# Patient Record
Sex: MALE | URBAN - METROPOLITAN AREA
[De-identification: names, ages, dates, MRNs, and addresses within clinical notes are randomized per-mention and may not be internally consistent; named-entity substitution may affect disease eponyms.]

---

## 2020-12-10 ENCOUNTER — APPOINTMENT (RX ONLY)
Dept: URBAN - METROPOLITAN AREA CLINIC 139 | Facility: CLINIC | Age: 75
Setting detail: DERMATOLOGY
End: 2020-12-10

## 2020-12-10 DIAGNOSIS — L81.4 OTHER MELANIN HYPERPIGMENTATION: ICD-10-CM

## 2020-12-10 DIAGNOSIS — L91.8 OTHER HYPERTROPHIC DISORDERS OF THE SKIN: ICD-10-CM

## 2020-12-10 DIAGNOSIS — L73.8 OTHER SPECIFIED FOLLICULAR DISORDERS: ICD-10-CM

## 2020-12-10 DIAGNOSIS — L72.3 SEBACEOUS CYST: ICD-10-CM

## 2020-12-10 DIAGNOSIS — L82.1 OTHER SEBORRHEIC KERATOSIS: ICD-10-CM

## 2020-12-10 DIAGNOSIS — Z12.83 ENCOUNTER FOR SCREENING FOR MALIGNANT NEOPLASM OF SKIN: ICD-10-CM

## 2020-12-10 PROCEDURE — 99202 OFFICE O/P NEW SF 15 MIN: CPT

## 2020-12-10 PROCEDURE — ? COUNSELING

## 2020-12-10 ASSESSMENT — LOCATION DETAILED DESCRIPTION DERM
LOCATION DETAILED: RIGHT LATERAL FOREHEAD
LOCATION DETAILED: RIGHT AXILLARY VAULT
LOCATION DETAILED: RIGHT FOREHEAD
LOCATION DETAILED: LEFT INFERIOR UPPER BACK
LOCATION DETAILED: RIGHT LATERAL TRAPEZIAL NECK
LOCATION DETAILED: LEFT AXILLARY VAULT
LOCATION DETAILED: RIGHT SUPERIOR LATERAL MALAR CHEEK
LOCATION DETAILED: RIGHT SUPERIOR UPPER BACK

## 2020-12-10 ASSESSMENT — PAIN INTENSITY VAS: HOW INTENSE IS YOUR PAIN 0 BEING NO PAIN, 10 BEING THE MOST SEVERE PAIN POSSIBLE?: NO PAIN

## 2020-12-10 ASSESSMENT — LOCATION SIMPLE DESCRIPTION DERM
LOCATION SIMPLE: RIGHT FOREHEAD
LOCATION SIMPLE: RIGHT UPPER BACK
LOCATION SIMPLE: POSTERIOR NECK
LOCATION SIMPLE: LEFT UPPER BACK
LOCATION SIMPLE: LEFT AXILLARY VAULT
LOCATION SIMPLE: RIGHT CHEEK
LOCATION SIMPLE: RIGHT AXILLARY VAULT

## 2020-12-10 ASSESSMENT — LOCATION ZONE DERM
LOCATION ZONE: TRUNK
LOCATION ZONE: NECK
LOCATION ZONE: FACE
LOCATION ZONE: AXILLAE

## 2022-04-01 ENCOUNTER — APPOINTMENT (RX ONLY)
Dept: URBAN - METROPOLITAN AREA CLINIC 139 | Facility: CLINIC | Age: 77
Setting detail: DERMATOLOGY
End: 2022-04-01

## 2022-04-01 DIAGNOSIS — L57.0 ACTINIC KERATOSIS: ICD-10-CM

## 2022-04-01 DIAGNOSIS — Z12.83 ENCOUNTER FOR SCREENING FOR MALIGNANT NEOPLASM OF SKIN: ICD-10-CM

## 2022-04-01 DIAGNOSIS — D17 BENIGN LIPOMATOUS NEOPLASM: ICD-10-CM

## 2022-04-01 DIAGNOSIS — L91.8 OTHER HYPERTROPHIC DISORDERS OF THE SKIN: ICD-10-CM

## 2022-04-01 DIAGNOSIS — L73.8 OTHER SPECIFIED FOLLICULAR DISORDERS: ICD-10-CM

## 2022-04-01 DIAGNOSIS — L82.0 INFLAMED SEBORRHEIC KERATOSIS: ICD-10-CM

## 2022-04-01 PROBLEM — D17.0 BENIGN LIPOMATOUS NEOPLASM OF SKIN AND SUBCUTANEOUS TISSUE OF HEAD, FACE AND NECK: Status: ACTIVE | Noted: 2022-04-01

## 2022-04-01 PROCEDURE — ? SKIN TAG REMOVAL

## 2022-04-01 PROCEDURE — ? OTHER

## 2022-04-01 PROCEDURE — ? LIQUID NITROGEN

## 2022-04-01 PROCEDURE — 99213 OFFICE O/P EST LOW 20 MIN: CPT | Mod: 25

## 2022-04-01 PROCEDURE — 17000 DESTRUCT PREMALG LESION: CPT | Mod: 59

## 2022-04-01 PROCEDURE — 11200 RMVL SKIN TAGS UP TO&INC 15: CPT | Mod: 59

## 2022-04-01 PROCEDURE — 17110 DESTRUCTION B9 LES UP TO 14: CPT

## 2022-04-01 PROCEDURE — ? COUNSELING

## 2022-04-01 ASSESSMENT — LOCATION DETAILED DESCRIPTION DERM
LOCATION DETAILED: MEDIAL FRONTAL SCALP
LOCATION DETAILED: LEFT CENTRAL FRONTAL SCALP
LOCATION DETAILED: RIGHT FOREHEAD
LOCATION DETAILED: RIGHT INFERIOR LATERAL MALAR CHEEK
LOCATION DETAILED: RIGHT CENTRAL TEMPLE
LOCATION DETAILED: LEFT INFERIOR MEDIAL FOREHEAD
LOCATION DETAILED: LEFT MEDIAL TRAPEZIAL NECK

## 2022-04-01 ASSESSMENT — LOCATION SIMPLE DESCRIPTION DERM
LOCATION SIMPLE: LEFT FOREHEAD
LOCATION SIMPLE: RIGHT FOREHEAD
LOCATION SIMPLE: FRONTAL SCALP
LOCATION SIMPLE: RIGHT CHEEK
LOCATION SIMPLE: POSTERIOR NECK
LOCATION SIMPLE: LEFT SCALP
LOCATION SIMPLE: RIGHT TEMPLE

## 2022-04-01 ASSESSMENT — LOCATION ZONE DERM
LOCATION ZONE: FACE
LOCATION ZONE: NECK
LOCATION ZONE: SCALP

## 2022-04-01 NOTE — PROCEDURE: SKIN TAG REMOVAL
Detail Level: Detailed
Medical Necessity Information: It is in your best interest to select a reason for this procedure from the list below. All of these items fulfill various CMS LCD requirements except the new and changing color options.
Consent: Written consent obtained and the risks of skin tag removal was reviewed with the patient including but not limited to bleeding, pigmentary change, infection, pain, and remote possibility of scarring.
Add Associated Diagnoses If Applicable When Selecting Medical Necessity: Yes
Medical Necessity Clause: This procedure was medically necessary because the lesions that were treated were:
Include Z78.9 (Other Specified Conditions Influencing Health Status) As An Associated Diagnosis?: No

## 2022-04-01 NOTE — PROCEDURE: LIQUID NITROGEN
Render Post-Care Instructions In Note?: no
Show Applicator Variable?: Yes
Number Of Freeze-Thaw Cycles: 1 freeze-thaw cycle
Consent: The patient's consent was obtained including but not limited to risks of crusting, scabbing, blistering, scarring, darker or lighter pigmentary change, recurrence, incomplete removal and infection.
Detail Level: Detailed
Post-Care Instructions: I reviewed with the patient in detail post-care instructions. Patient is to wear sunprotection, and avoid picking at any of the treated lesions. Pt may apply Vaseline to crusted or scabbing areas.
Duration Of Freeze Thaw-Cycle (Seconds): 0
Medical Necessity Clause: This procedure was medically necessary because the lesions that were treated were:
Spray Paint Text: The liquid nitrogen was applied to the skin utilizing a spray paint frosting technique.
Medical Necessity Information: It is in your best interest to select a reason for this procedure from the list below. All of these items fulfill various CMS LCD requirements except the new and changing color options.
Total Number Of Lesions Treated: 2
Detail Level: Zone

## 2022-04-01 NOTE — PROCEDURE: OTHER
Detail Level: Zone
Render Risk Assessment In Note?: no
Note Text (......Xxx Chief Complaint.): This diagnosis correlates with the
Other (Free Text): Will continue to monitor lesion on mid frontal scalp

## 2023-10-25 ENCOUNTER — APPOINTMENT (RX ONLY)
Dept: URBAN - METROPOLITAN AREA CLINIC 139 | Facility: CLINIC | Age: 78
Setting detail: DERMATOLOGY
End: 2023-10-25

## 2023-10-25 DIAGNOSIS — L82.0 INFLAMED SEBORRHEIC KERATOSIS: ICD-10-CM

## 2023-10-25 DIAGNOSIS — L81.4 OTHER MELANIN HYPERPIGMENTATION: ICD-10-CM

## 2023-10-25 DIAGNOSIS — L82.1 OTHER SEBORRHEIC KERATOSIS: ICD-10-CM

## 2023-10-25 DIAGNOSIS — L73.8 OTHER SPECIFIED FOLLICULAR DISORDERS: ICD-10-CM

## 2023-10-25 PROCEDURE — 17110 DESTRUCTION B9 LES UP TO 14: CPT

## 2023-10-25 PROCEDURE — ? LIQUID NITROGEN

## 2023-10-25 PROCEDURE — ? COUNSELING

## 2023-10-25 PROCEDURE — 99212 OFFICE O/P EST SF 10 MIN: CPT | Mod: 25

## 2023-10-25 ASSESSMENT — LOCATION DETAILED DESCRIPTION DERM
LOCATION DETAILED: RIGHT SUPERIOR FRONTAL SCALP
LOCATION DETAILED: RIGHT INFERIOR CENTRAL MALAR CHEEK
LOCATION DETAILED: LEFT INFERIOR CENTRAL MALAR CHEEK

## 2023-10-25 ASSESSMENT — LOCATION SIMPLE DESCRIPTION DERM
LOCATION SIMPLE: RIGHT CHEEK
LOCATION SIMPLE: LEFT CHEEK
LOCATION SIMPLE: SCALP

## 2023-10-25 ASSESSMENT — LOCATION ZONE DERM
LOCATION ZONE: FACE
LOCATION ZONE: SCALP

## 2023-11-15 ENCOUNTER — APPOINTMENT (RX ONLY)
Dept: URBAN - METROPOLITAN AREA CLINIC 139 | Facility: CLINIC | Age: 78
Setting detail: DERMATOLOGY
End: 2023-11-15

## 2023-11-15 DIAGNOSIS — L72.0 EPIDERMAL CYST: ICD-10-CM

## 2023-11-15 PROCEDURE — ? COUNSELING

## 2023-11-15 PROCEDURE — ? PRESCRIPTION

## 2023-11-15 PROCEDURE — ? OBSERVATION AND MEASURE

## 2023-11-15 PROCEDURE — 11900 INJECT SKIN LESIONS </W 7: CPT

## 2023-11-15 PROCEDURE — ? INTRALESIONAL KENALOG

## 2023-11-15 RX ORDER — DOXYCYCLINE HYCLATE 100 MG/1
1 CAPSULE, GELATIN COATED ORAL BID
Qty: 20 | Refills: 0 | Status: ERX

## 2023-11-15 ASSESSMENT — LOCATION DETAILED DESCRIPTION DERM: LOCATION DETAILED: LEFT MEDIAL UPPER BACK

## 2023-11-15 ASSESSMENT — LOCATION ZONE DERM: LOCATION ZONE: TRUNK

## 2023-11-15 ASSESSMENT — LOCATION SIMPLE DESCRIPTION DERM: LOCATION SIMPLE: LEFT UPPER BACK

## 2023-11-15 NOTE — PROCEDURE: INTRALESIONAL KENALOG
Detail Level: Detailed
Medical Necessity Clause: This procedure was medically necessary because the lesions that were treated were:
How Many Mls Were Removed From The 40 Mg/Ml (1ml) Vial When Preparing The Injectable Solution?: 0
Require Ndc Code?: No
Kenalog Type Of Vial: Multiple Dose
Kenalog Preparation: Kenalog
Administered By (Optional): Aliza Baptiste
Lot # (Optional): [id number]
Expiration Date (Optional): NOV 2025
Consent: The risks of atrophy were reviewed with the patient.
Total Volume Injected (Ccs- Only Use Numbers And Decimals): 1.0
Validate Note Data When Using Inventory: Yes
Which Kenalog Vial Was Used?: Kenalog 10 mg/ml (5 ml vial)
Concentration Of Solution Injected (Mg/Ml): 20.0
Left arm;

## 2023-12-06 ENCOUNTER — APPOINTMENT (RX ONLY)
Dept: URBAN - METROPOLITAN AREA CLINIC 139 | Facility: CLINIC | Age: 78
Setting detail: DERMATOLOGY
End: 2023-12-06

## 2023-12-06 DIAGNOSIS — L72.0 EPIDERMAL CYST: ICD-10-CM | Status: INADEQUATELY CONTROLLED

## 2023-12-06 PROCEDURE — ? COUNSELING

## 2023-12-06 PROCEDURE — 11900 INJECT SKIN LESIONS </W 7: CPT

## 2023-12-06 PROCEDURE — ? TREATMENT REGIMEN

## 2023-12-06 PROCEDURE — 99212 OFFICE O/P EST SF 10 MIN: CPT | Mod: 25

## 2023-12-06 PROCEDURE — ? PRESCRIPTION

## 2023-12-06 PROCEDURE — ? INTRALESIONAL KENALOG

## 2023-12-06 PROCEDURE — ? OBSERVATION AND MEASURE

## 2023-12-06 PROCEDURE — ? SEPARATE AND IDENTIFIABLE DOCUMENTATION

## 2023-12-06 RX ORDER — AMOXICILLIN AND CLAVULANATE POTASSIUM 875; 125 MG/1; MG/1
1 TABLET, FILM COATED ORAL BID
Qty: 14 | Refills: 0 | Status: ERX

## 2023-12-06 ASSESSMENT — LOCATION ZONE DERM: LOCATION ZONE: TRUNK

## 2023-12-06 ASSESSMENT — LOCATION DETAILED DESCRIPTION DERM: LOCATION DETAILED: LEFT MEDIAL UPPER BACK

## 2023-12-06 ASSESSMENT — LOCATION SIMPLE DESCRIPTION DERM: LOCATION SIMPLE: LEFT UPPER BACK

## 2023-12-06 NOTE — PROCEDURE: TREATMENT REGIMEN
Detail Level: Zone
Discontinue Regimen: doxycycline hyclate 100 mg capsule BID
Initiate Treatment: Augmentin 500 mg-125 mg tablet BID

## 2023-12-06 NOTE — PROCEDURE: INTRALESIONAL KENALOG
Detail Level: Detailed
Medical Necessity Clause: This procedure was medically necessary because the lesions that were treated were:
How Many Mls Were Removed From The 40 Mg/Ml (1ml) Vial When Preparing The Injectable Solution?: 0
Require Ndc Code?: No
Kenalog Type Of Vial: Multiple Dose
Kenalog Preparation: Kenalog
Administered By (Optional): Aliza Baptiste
Lot # (Optional): [id number]
Expiration Date (Optional): NOV 2025
Consent: The risks of atrophy were reviewed with the patient.
Total Volume Injected (Ccs- Only Use Numbers And Decimals): 1.5
Validate Note Data When Using Inventory: Yes
Which Kenalog Vial Was Used?: Kenalog 10 mg/ml (5 ml vial)
Concentration Of Solution Injected (Mg/Ml): 10.0

## 2023-12-13 ENCOUNTER — APPOINTMENT (RX ONLY)
Dept: URBAN - METROPOLITAN AREA CLINIC 139 | Facility: CLINIC | Age: 78
Setting detail: DERMATOLOGY
End: 2023-12-13

## 2023-12-13 DIAGNOSIS — L72.0 EPIDERMAL CYST: ICD-10-CM

## 2023-12-13 PROCEDURE — ? ADDITIONAL NOTES

## 2023-12-13 PROCEDURE — 11900 INJECT SKIN LESIONS </W 7: CPT

## 2023-12-13 PROCEDURE — ? INTRALESIONAL KENALOG

## 2023-12-13 PROCEDURE — ? COUNSELING

## 2023-12-13 ASSESSMENT — LOCATION ZONE DERM: LOCATION ZONE: TRUNK

## 2023-12-13 ASSESSMENT — LOCATION SIMPLE DESCRIPTION DERM: LOCATION SIMPLE: LEFT UPPER BACK

## 2023-12-13 ASSESSMENT — LOCATION DETAILED DESCRIPTION DERM: LOCATION DETAILED: LEFT MEDIAL UPPER BACK

## 2023-12-13 NOTE — PROCEDURE: INTRALESIONAL KENALOG
Which Kenalog Vial Was Used?: Kenalog 40 mg/ml (10 ml vial)
How Many Mls Were Removed From The 80 Mg/Ml (5ml) Vial When Preparing The Injectable Solution?: 0
Bill For Wasted Drug?: no
Kenalog Type Of Vial: Multiple Dose
Validate Note Data When Using Inventory: Yes
Size Of Lesion (Optional): 7
Kenalog Preparation: Kenalog
Lot # (Optional): [id number]
Medical Necessity Clause: This procedure was medically necessary because the lesions that were treated were:
Administered By (Optional): Aliza Baptiste
Detail Level: Detailed
X Size Of Lesion In Cm (Optional): 6
Expiration Date (Optional): JAN 2025
Concentration Of Solution Injected (Mg/Ml): 40.0
Total Volume Injected (Ccs- Only Use Numbers And Decimals): 1
Consent: The risks of atrophy were reviewed with the patient.

## 2023-12-13 NOTE — PROCEDURE: ADDITIONAL NOTES
Detail Level: Simple
Render Risk Assessment In Note?: no
Additional Notes: Dr. Flowers came to evaluate cyst and agrees we should refer patient to [facility] for excision due to size.

## 2024-01-02 ENCOUNTER — APPOINTMENT (RX ONLY)
Dept: URBAN - METROPOLITAN AREA CLINIC 139 | Facility: CLINIC | Age: 79
Setting detail: DERMATOLOGY
End: 2024-01-02

## 2024-01-02 DIAGNOSIS — L72.0 EPIDERMAL CYST: ICD-10-CM

## 2024-01-02 PROCEDURE — ? PRESCRIPTION

## 2024-01-02 PROCEDURE — 10061 I&D ABSCESS COMP/MULTIPLE: CPT

## 2024-01-02 PROCEDURE — ? COUNSELING

## 2024-01-02 PROCEDURE — ? INCISION AND DRAINAGE

## 2024-01-02 RX ORDER — CEPHALEXIN 500 MG/1
TABLET ORAL BID
Qty: 20 | Refills: 1 | Status: ERX | COMMUNITY
Start: 2024-01-02

## 2024-01-02 RX ADMIN — CEPHALEXIN: 500 TABLET ORAL at 00:00

## 2024-01-02 ASSESSMENT — LOCATION SIMPLE DESCRIPTION DERM: LOCATION SIMPLE: LEFT UPPER BACK

## 2024-01-02 ASSESSMENT — LOCATION DETAILED DESCRIPTION DERM
LOCATION DETAILED: LEFT MEDIAL UPPER BACK
LOCATION DETAILED: LEFT INFERIOR MEDIAL UPPER BACK

## 2024-01-02 ASSESSMENT — LOCATION ZONE DERM: LOCATION ZONE: TRUNK

## 2024-01-02 NOTE — PROCEDURE: INCISION AND DRAINAGE
Detail Level: Detailed
Lesion Type: Abscess
I&D Type: complicated
Method: 11 blade
Curette: No
Anesthesia Type: 1% lidocaine with epinephrine
Anesthesia Volume In Cc: 3
Size Of Lesion In Cm (Optional But May Be Required For Some Insurances): 0
Drainage Amount?: significant
Drainage Type?: purulent  and keratinaceous
Wound Care: Petrolatum
Dressing: dry sterile dressing
Curette Text (Optional): After the contents were expressed a curette was used to partially remove the cyst wall.
Epidermal Sutures: 4-0 Ethilon
Epidermal Closure: simple interrupted
Suture Text: The incision was partially closed with
Consent was obtained and risks were reviewed including but not limited to delayed wound healing, infection, need for multiple I and D's, and pain.
Post-Care Instructions: I reviewed with the patient in detail post-care instructions. Patient should keep wound covered and call the office should any redness, pain, swelling or worsening occur.